# Patient Record
Sex: FEMALE | Race: WHITE | NOT HISPANIC OR LATINO | Employment: UNEMPLOYED | URBAN - METROPOLITAN AREA
[De-identification: names, ages, dates, MRNs, and addresses within clinical notes are randomized per-mention and may not be internally consistent; named-entity substitution may affect disease eponyms.]

---

## 2017-05-12 ENCOUNTER — HISTORICAL (OUTPATIENT)
Dept: RADIOLOGY | Facility: HOSPITAL | Age: 57
End: 2017-05-12

## 2019-05-31 ENCOUNTER — HISTORICAL (OUTPATIENT)
Dept: RADIOLOGY | Facility: HOSPITAL | Age: 59
End: 2019-05-31

## 2020-08-28 ENCOUNTER — HISTORICAL (OUTPATIENT)
Dept: RADIOLOGY | Facility: HOSPITAL | Age: 60
End: 2020-08-28

## 2020-10-27 ENCOUNTER — HISTORICAL (OUTPATIENT)
Dept: ADMINISTRATIVE | Facility: HOSPITAL | Age: 60
End: 2020-10-27

## 2020-11-10 ENCOUNTER — HISTORICAL (OUTPATIENT)
Dept: ADMINISTRATIVE | Facility: HOSPITAL | Age: 60
End: 2020-11-10

## 2020-11-10 LAB — SARS-COV-2 RNA RESP QL NAA+PROBE: NOT DETECTED

## 2020-11-11 ENCOUNTER — HISTORICAL (OUTPATIENT)
Dept: ADMINISTRATIVE | Facility: HOSPITAL | Age: 60
End: 2020-11-11

## 2020-11-11 LAB — GRAM STN SPEC: NORMAL

## 2020-11-14 LAB — FINAL CULTURE: NORMAL

## 2020-12-14 LAB — FINAL CULTURE: NORMAL

## 2020-12-22 ENCOUNTER — HISTORICAL (OUTPATIENT)
Dept: ADMINISTRATIVE | Facility: HOSPITAL | Age: 60
End: 2020-12-22

## 2021-02-09 ENCOUNTER — HISTORICAL (OUTPATIENT)
Dept: ADMINISTRATIVE | Facility: HOSPITAL | Age: 61
End: 2021-02-09

## 2021-05-04 ENCOUNTER — HISTORICAL (OUTPATIENT)
Dept: ADMINISTRATIVE | Facility: HOSPITAL | Age: 61
End: 2021-05-04

## 2022-04-10 ENCOUNTER — HISTORICAL (OUTPATIENT)
Dept: ADMINISTRATIVE | Facility: HOSPITAL | Age: 62
End: 2022-04-10

## 2022-04-20 ENCOUNTER — HISTORICAL (OUTPATIENT)
Dept: ADMINISTRATIVE | Facility: HOSPITAL | Age: 62
End: 2022-04-20

## 2022-04-29 VITALS
WEIGHT: 240.31 LBS | SYSTOLIC BLOOD PRESSURE: 121 MMHG | DIASTOLIC BLOOD PRESSURE: 80 MMHG | BODY MASS INDEX: 44.22 KG/M2 | HEIGHT: 62 IN

## 2022-04-30 NOTE — OP NOTE
DATE OF SURGERY:    11/11/2020    SURGEON:  Quinton Flores MD  ASSISTANT:  Nurse Nikki Shaw    PREOPERATIVE DIAGNOSES:    1. Right lower extremity surgical wound dehiscence.  2. Right bicondylar tibial plateau sequela.    POSTOPERATIVE DIAGNOSES:    1. Right lower extremity surgical wound dehiscence.  2. Right bicondylar tibial plateau sequela.    PROCEDURE:  Secondary wound closure, surgical wound dehiscence.    ANESTHESIA:  General.    ESTIMATED BLOOD LOSS:  20 cc.    ASSISTANT ATTESTATION:  nurse nikki Shaw, necessary for a skilled set of hands to assist with deep retraction as well as layered deep closure.    COMPLICATIONS:  None.    COUNTS:  All counts correct x2 at the end of the case.    INDICATIONS FOR PROCEDURE:  Ms. Arredondo is a 60-year-old female who sustained a right bicondylar tibial plateau fracture.  She has a history of morbid obesity.  She had some wound drainage from 2 separate surgical sites, one on the proximal lateral and then one on the distal medial aspect of her tibia.  She had been monitored in the clinic.  She had evidence of fibrinous exudate.  No purulent drainage was noted.  She is brought to the operating room today, after risks and benefits of treatment were discussed, for secondary wound closure following debridement of her wounds.    PROCEDURE IN DETAIL:  After informed consent was obtained, the patient was met in the preoperative holding area.  Her site was marked, and she was taken to the operating room.  She was placed supine on the operating table.  General anesthesia was induced.  All bony prominences were well padded.  Preoperative antibiotics were given.  Her right lower extremity was prepped, draped in a standard sterile fashion.  A time-out was done to indicate correct operative limb and procedure.  She had an approximately 7 cm area of her proximal lateral incision that was ellipsed sharply with a 15 blade back to good, clean  edges.  The fibrinous exudate was excised using both a scalpel and a rongeur.  A curette was used to clean the wound.  It was thoroughly irrigated with 3 L of normal saline after cultures were obtained.  Attention was then turned to the medial aspect.  A 5 cm incision on the distal aspect of her medial tibia was opened as well.  Excised the skin edges.  She was noted to have some retained sutures.  It appeared to be a deep fluid collection.  No purulence was noted.  She had some evidence of fat necrosis with serous drainage.  This area was opened, curetted clean, irrigated with cysto tubing as well.  A gram of vancomycin was split between the 2 incisions and placed deep within the wound.  Cellerate wound enhancer was then placed along the skin edges.  A deep closure was performed with a #1 PDS, followed by 2-0 Monocryl and 2-0 Prolene sutures.  Xeroform and island dressings were applied.  The patient was awakened, extubated, and taken to recovery in stable condition.    POSTOPERATIVE PLAN:  She will be discharged home today.  Nonweightbearing to the right lower extremity.  We will continue her on p.o. clindamycin.  We will monitor her cultures.  She will follow up in 2 weeks for removal of her sutures.        ______________________________  MD DESTINEY Fontenot/DODIE  DD:  11/11/2020  Time:  08:11AM  DT:  11/11/2020  Time:  08:35AM  Job #:  [id number]

## 2022-11-16 DIAGNOSIS — Z78.0 POSTMENOPAUSAL STATE: ICD-10-CM

## 2022-11-16 DIAGNOSIS — Z12.31 ENCOUNTER FOR SCREENING MAMMOGRAM FOR BREAST CANCER: Primary | ICD-10-CM

## 2022-11-23 ENCOUNTER — HOSPITAL ENCOUNTER (OUTPATIENT)
Dept: RADIOLOGY | Facility: HOSPITAL | Age: 62
Discharge: HOME | End: 2022-11-23
Attending: NURSE PRACTITIONER
Payer: MEDICAID

## 2022-11-23 DIAGNOSIS — Z78.0 POSTMENOPAUSAL STATE: ICD-10-CM

## 2022-11-23 DIAGNOSIS — Z12.31 ENCOUNTER FOR SCREENING MAMMOGRAM FOR BREAST CANCER: ICD-10-CM

## 2022-11-23 PROCEDURE — 77067 MAMMO DIGITAL SCREENING BILAT WITH TOMO: ICD-10-PCS | Mod: 26,,, | Performed by: STUDENT IN AN ORGANIZED HEALTH CARE EDUCATION/TRAINING PROGRAM

## 2022-11-23 PROCEDURE — 77067 SCR MAMMO BI INCL CAD: CPT | Mod: TC

## 2022-11-23 PROCEDURE — 77080 DXA BONE DENSITY AXIAL: CPT | Mod: TC

## 2022-11-23 PROCEDURE — 77063 MAMMO DIGITAL SCREENING BILAT WITH TOMO: ICD-10-PCS | Mod: 26,,, | Performed by: STUDENT IN AN ORGANIZED HEALTH CARE EDUCATION/TRAINING PROGRAM

## 2022-11-23 PROCEDURE — 77063 BREAST TOMOSYNTHESIS BI: CPT | Mod: 26,,, | Performed by: STUDENT IN AN ORGANIZED HEALTH CARE EDUCATION/TRAINING PROGRAM

## 2022-11-23 PROCEDURE — 77067 SCR MAMMO BI INCL CAD: CPT | Mod: 26,,, | Performed by: STUDENT IN AN ORGANIZED HEALTH CARE EDUCATION/TRAINING PROGRAM

## 2023-03-21 DIAGNOSIS — Z87.891 HISTORY OF TOBACCO USE: Primary | ICD-10-CM

## 2023-04-24 ENCOUNTER — HOSPITAL ENCOUNTER (OUTPATIENT)
Dept: RADIOLOGY | Facility: HOSPITAL | Age: 63
Discharge: HOME | End: 2023-04-24
Attending: NURSE PRACTITIONER
Payer: MEDICAID

## 2023-04-24 DIAGNOSIS — Z87.891 HISTORY OF TOBACCO USE: ICD-10-CM

## 2023-04-24 PROCEDURE — 71271 CT THORAX LUNG CANCER SCR C-: CPT | Mod: TC

## 2023-08-24 ENCOUNTER — HOSPITAL ENCOUNTER (OUTPATIENT)
Dept: RADIOLOGY | Facility: HOSPITAL | Age: 63
Discharge: HOME | End: 2023-08-24
Payer: MEDICAID

## 2023-08-24 DIAGNOSIS — M25.552 LEFT HIP PAIN: ICD-10-CM

## 2023-08-24 PROCEDURE — 73501 X-RAY EXAM HIP UNI 1 VIEW: CPT | Mod: TC,LT

## 2023-10-12 DIAGNOSIS — R31.9 HEMATURIA: Primary | ICD-10-CM

## 2023-10-20 ENCOUNTER — HOSPITAL ENCOUNTER (OUTPATIENT)
Dept: RADIOLOGY | Facility: HOSPITAL | Age: 63
Discharge: HOME | End: 2023-10-20
Attending: NURSE PRACTITIONER
Payer: MEDICAID

## 2023-10-20 DIAGNOSIS — R31.9 HEMATURIA: ICD-10-CM

## 2023-10-20 PROCEDURE — 76770 US EXAM ABDO BACK WALL COMP: CPT | Mod: TC

## 2023-10-23 DIAGNOSIS — R31.9 HEMATURIA, UNSPECIFIED TYPE: Primary | ICD-10-CM

## 2023-11-08 ENCOUNTER — OFFICE VISIT (OUTPATIENT)
Dept: UROLOGY | Facility: CLINIC | Age: 63
End: 2023-11-08
Payer: MEDICAID

## 2023-11-08 VITALS
SYSTOLIC BLOOD PRESSURE: 135 MMHG | RESPIRATION RATE: 20 BRPM | HEIGHT: 62 IN | DIASTOLIC BLOOD PRESSURE: 88 MMHG | HEART RATE: 77 BPM | OXYGEN SATURATION: 97 % | TEMPERATURE: 98 F | WEIGHT: 257 LBS | BODY MASS INDEX: 47.29 KG/M2

## 2023-11-08 DIAGNOSIS — R82.90 ABNORMAL URINALYSIS: ICD-10-CM

## 2023-11-08 DIAGNOSIS — R31.29 OTHER MICROSCOPIC HEMATURIA: ICD-10-CM

## 2023-11-08 DIAGNOSIS — R33.9 URINARY RETENTION: Primary | ICD-10-CM

## 2023-11-08 DIAGNOSIS — R31.9 HEMATURIA, UNSPECIFIED TYPE: ICD-10-CM

## 2023-11-08 LAB
BILIRUB SERPL-MCNC: NEGATIVE MG/DL
BLOOD URINE, POC: NORMAL
COLOR, POC UA: YELLOW
GLUCOSE UR QL STRIP: NEGATIVE
KETONES UR QL STRIP: NEGATIVE
LEUKOCYTE ESTERASE URINE, POC: NORMAL
NITRITE, POC UA: NEGATIVE
PH, POC UA: 6
POC RESIDUAL URINE VOLUME: 189 ML (ref 0–100)
PROTEIN, POC: 30
SPECIFIC GRAVITY, POC UA: 1.03
UROBILINOGEN, POC UA: 1

## 2023-11-08 PROCEDURE — 1159F MED LIST DOCD IN RCRD: CPT | Mod: CPTII,,, | Performed by: NURSE PRACTITIONER

## 2023-11-08 PROCEDURE — 4010F PR ACE/ARB THEARPY RXD/TAKEN: ICD-10-PCS | Mod: CPTII,,, | Performed by: NURSE PRACTITIONER

## 2023-11-08 PROCEDURE — 81001 URINALYSIS AUTO W/SCOPE: CPT | Mod: PBBFAC | Performed by: NURSE PRACTITIONER

## 2023-11-08 PROCEDURE — 1160F PR REVIEW ALL MEDS BY PRESCRIBER/CLIN PHARMACIST DOCUMENTED: ICD-10-PCS | Mod: CPTII,,, | Performed by: NURSE PRACTITIONER

## 2023-11-08 PROCEDURE — 3079F PR MOST RECENT DIASTOLIC BLOOD PRESSURE 80-89 MM HG: ICD-10-PCS | Mod: CPTII,,, | Performed by: NURSE PRACTITIONER

## 2023-11-08 PROCEDURE — 3008F BODY MASS INDEX DOCD: CPT | Mod: CPTII,,, | Performed by: NURSE PRACTITIONER

## 2023-11-08 PROCEDURE — 99204 PR OFFICE/OUTPT VISIT, NEW, LEVL IV, 45-59 MIN: ICD-10-PCS | Mod: S$PBB,,, | Performed by: NURSE PRACTITIONER

## 2023-11-08 PROCEDURE — 1160F RVW MEDS BY RX/DR IN RCRD: CPT | Mod: CPTII,,, | Performed by: NURSE PRACTITIONER

## 2023-11-08 PROCEDURE — 3079F DIAST BP 80-89 MM HG: CPT | Mod: CPTII,,, | Performed by: NURSE PRACTITIONER

## 2023-11-08 PROCEDURE — 1159F PR MEDICATION LIST DOCUMENTED IN MEDICAL RECORD: ICD-10-PCS | Mod: CPTII,,, | Performed by: NURSE PRACTITIONER

## 2023-11-08 PROCEDURE — 3008F PR BODY MASS INDEX (BMI) DOCUMENTED: ICD-10-PCS | Mod: CPTII,,, | Performed by: NURSE PRACTITIONER

## 2023-11-08 PROCEDURE — 99215 OFFICE O/P EST HI 40 MIN: CPT | Mod: PBBFAC | Performed by: NURSE PRACTITIONER

## 2023-11-08 PROCEDURE — 4010F ACE/ARB THERAPY RXD/TAKEN: CPT | Mod: CPTII,,, | Performed by: NURSE PRACTITIONER

## 2023-11-08 PROCEDURE — 3075F SYST BP GE 130 - 139MM HG: CPT | Mod: CPTII,,, | Performed by: NURSE PRACTITIONER

## 2023-11-08 PROCEDURE — 3075F PR MOST RECENT SYSTOLIC BLOOD PRESS GE 130-139MM HG: ICD-10-PCS | Mod: CPTII,,, | Performed by: NURSE PRACTITIONER

## 2023-11-08 PROCEDURE — 87086 URINE CULTURE/COLONY COUNT: CPT | Performed by: NURSE PRACTITIONER

## 2023-11-08 PROCEDURE — 51798 US URINE CAPACITY MEASURE: CPT | Mod: PBBFAC | Performed by: NURSE PRACTITIONER

## 2023-11-08 PROCEDURE — 99204 OFFICE O/P NEW MOD 45 MIN: CPT | Mod: S$PBB,,, | Performed by: NURSE PRACTITIONER

## 2023-11-08 RX ORDER — PANTOPRAZOLE SODIUM 20 MG/1
20 TABLET, DELAYED RELEASE ORAL
COMMUNITY
Start: 2023-11-01

## 2023-11-08 RX ORDER — ALENDRONATE SODIUM 70 MG/1
70 TABLET ORAL
COMMUNITY
Start: 2023-11-03

## 2023-11-08 RX ORDER — DULOXETIN HYDROCHLORIDE 60 MG/1
60 CAPSULE, DELAYED RELEASE ORAL
COMMUNITY
Start: 2023-11-01

## 2023-11-08 RX ORDER — GABAPENTIN 300 MG/1
300 CAPSULE ORAL 3 TIMES DAILY
COMMUNITY
Start: 2023-06-05

## 2023-11-08 RX ORDER — ROSUVASTATIN CALCIUM 5 MG/1
5 TABLET, COATED ORAL
COMMUNITY
Start: 2023-11-01

## 2023-11-08 RX ORDER — OLMESARTAN MEDOXOMIL 40 MG/1
40 TABLET ORAL
COMMUNITY
Start: 2023-11-01

## 2023-11-08 RX ORDER — TAMSULOSIN HYDROCHLORIDE 0.4 MG/1
0.4 CAPSULE ORAL DAILY
Qty: 30 CAPSULE | Refills: 11 | Status: SHIPPED | OUTPATIENT
Start: 2023-11-08 | End: 2024-11-07

## 2023-11-08 RX ORDER — CETIRIZINE HYDROCHLORIDE 10 MG/1
10 TABLET ORAL
COMMUNITY
Start: 2023-11-01

## 2023-11-08 NOTE — PROGRESS NOTES
Patient seen by Megan MORALES. Pt will RTC 1 month with CT scan. Pt will also be scheduled for cysto. Pt eduction given both written and verbal.

## 2023-11-08 NOTE — PROGRESS NOTES
Chief Complaint:   Chief Complaint   Patient presents with    Hematuria     Complaints of urinary incontinence       HPI: Chief Complaint:   Chief Complaint   Patient presents with    Hematuria     Complaints of urinary incontinence       HPI:  Patient is a 63-year-old female referred to Urology for urinary incontinence.  Patient seen by PCP with episodes of urinary frequency and urgency and incontinence wearing 3 and 4 saturated pads per day and also nocturia 3-4 episodes at night.  Patient denies any dysuria, urinary retention, gross hematuria.  Bladder scan 194 mL.  Patient has a abnormal urinalysis specimen therefore we will run a culture and sensitivity on patient is urinalysis.  Patient has a persistent history of hematuria therefore will get a cystoscope also along with a CT of the abdomen pelvis with and without contrast.    Allergies:  Review of patient's allergies indicates:  No Known Allergies    Medications:  Current Outpatient Medications   Medication Sig Dispense Refill    alendronate (FOSAMAX) 70 MG tablet Take 70 mg by mouth every 7 days.      cetirizine (ZYRTEC) 10 MG tablet Take 10 mg by mouth.      DULoxetine (CYMBALTA) 60 MG capsule Take 60 mg by mouth.      gabapentin (NEURONTIN) 300 MG capsule Take 300 mg by mouth 3 (three) times daily.      olmesartan (BENICAR) 40 MG tablet Take 40 mg by mouth.      pantoprazole (PROTONIX) 20 MG tablet Take 20 mg by mouth.      rosuvastatin (CRESTOR) 5 MG tablet Take 5 mg by mouth.      tamsulosin (FLOMAX) 0.4 mg Cap Take 1 capsule (0.4 mg total) by mouth once daily. 30 capsule 11     No current facility-administered medications for this visit.       Review of Systems:  General: No fever, chills, fatigability, or weight loss.  Skin: No rashes, itching, or changes in color or texture of skin.  Chest: Denies ESPINOZA, cyanosis, wheezing, cough, and sputum production.  Abdomen: Appetite fine. No weight loss. Denies diarrhea, abdominal pain, hematemesis, or blood in  stool.  Musculoskeletal: No joint stiffness or swelling. Denies back pain.  : As above.  All other review of systems negative.    PMH:  Past Medical History:   Diagnosis Date    Hypertension        PSH:  Past Surgical History:   Procedure Laterality Date    gallbadder      Right Leg Surgery Right     Right wrist surgery Right     SPLENECTOMY, TOTAL         FamHx:  Family History   Problem Relation Age of Onset    Heart disease Father     Diabetes Father     Heart disease Mother     Pancreatic cancer Mother     Diabetes Mother        SocHx:  Social History     Socioeconomic History    Marital status: Unknown   Tobacco Use    Smoking status: Former     Types: Cigarettes    Smokeless tobacco: Never       Physical Exam:  Vitals:    11/08/23 1254   BP: 135/88   Pulse: 77   Resp: 20   Temp: 98.2 °F (36.8 °C)     General: A&Ox3, no apparent distress, no deformities  Neck: No masses, normal thyroid  Lungs: CTA sarah, no use of accessory muscles  Heart: RRR, no arrhythmias  Abdomen: Soft, NT, ND, no masses, no hernias, no hepatosplenomegaly  Lymphatic: Neck and groin nodes negative  Skin: The skin is warm and dry. No jaundice.  Ext: No c/c/e.    Labs:     Urinalysis:  Results for orders placed or performed in visit on 11/08/23   POCT URINE DIPSTICK WITH MICROSCOPE, AUTOMATED   Result Value Ref Range    Color, UA Yellow     Spec Grav UA 1.030     pH, UA 6.0     WBC, UA Small     Nitrite, UA Negative     Protein, POC 30     Glucose, UA Negative     Ketones, UA Negative     Urobilinogen, UA 1.0     Bilirubin, POC Negative     Blood, UA Moderate          Imaging:       Impression:  1. Hematuria, unspecified type  - Ambulatory referral/consult to Urology  - POCT URINE DIPSTICK WITH MICROSCOPE, AUTOMATED  - tamsulosin (FLOMAX) 0.4 mg Cap; Take 1 capsule (0.4 mg total) by mouth once daily.  Dispense: 30 capsule; Refill: 11  - CT Abdomen Pelvis W Wo Contrast; Future  - Creatinine, serum; Future  - Creatinine, serum; Future    2.  Urinary retention  - tamsulosin (FLOMAX) 0.4 mg Cap; Take 1 capsule (0.4 mg total) by mouth once daily.  Dispense: 30 capsule; Refill: 11      Plan: Instructed patient tamsulosin 0.4 mg p.o. daily.  Instructed patient on a CT of the abdomen pelvis with and without contrast to assess patient for hematuria and schedule patient for a cystoscope with Dr. Hartmann next available appointment.  Instructed patient on timed voiding every 2-3 hrs, double voiding, avoidance of bladder irritants such as alcohol, citrus foods, chocolate, caffeinated drinks, energy drinks, spicy foods, sugar, caffeine products, sodas. Instructed patient to avoid drinking fluids 1-2 hours prior to bedtime & void immediately before bedtime.  RTC one-month for re-evaluation of urinary retention with bladder scan.

## 2023-11-08 NOTE — PROGRESS NOTES
Bladder scan performed as per order, showing  189 mL of urine. Provider made aware of results.  Urine also sent to lab for culture. Pt will be notified of results.

## 2023-11-10 LAB — BACTERIA UR CULT: NORMAL

## 2023-11-20 ENCOUNTER — HOSPITAL ENCOUNTER (OUTPATIENT)
Dept: RADIOLOGY | Facility: HOSPITAL | Age: 63
Discharge: HOME | End: 2023-11-20
Attending: NURSE PRACTITIONER
Payer: MEDICAID

## 2023-11-20 DIAGNOSIS — R31.9 HEMATURIA, UNSPECIFIED TYPE: ICD-10-CM

## 2023-11-20 LAB
CREAT SERPL-MCNC: 0.6 MG/DL (ref 0.5–1.4)
SAMPLE: NORMAL

## 2023-11-20 PROCEDURE — 25500020 PHARM REV CODE 255: Performed by: NURSE PRACTITIONER

## 2023-11-20 PROCEDURE — 74178 CT ABD&PLV WO CNTR FLWD CNTR: CPT | Mod: TC

## 2023-11-20 RX ADMIN — IOPAMIDOL 100 ML: 755 INJECTION, SOLUTION INTRAVENOUS at 10:11

## 2023-12-29 ENCOUNTER — OFFICE VISIT (OUTPATIENT)
Dept: UROLOGY | Facility: CLINIC | Age: 63
End: 2023-12-29
Payer: MEDICAID

## 2023-12-29 VITALS
DIASTOLIC BLOOD PRESSURE: 79 MMHG | HEIGHT: 62 IN | WEIGHT: 254 LBS | SYSTOLIC BLOOD PRESSURE: 133 MMHG | BODY MASS INDEX: 46.74 KG/M2 | TEMPERATURE: 98 F | OXYGEN SATURATION: 98 % | RESPIRATION RATE: 18 BRPM | HEART RATE: 79 BPM

## 2023-12-29 DIAGNOSIS — R82.90 ABNORMAL URINE: ICD-10-CM

## 2023-12-29 DIAGNOSIS — R31.9 HEMATURIA, UNSPECIFIED TYPE: Primary | ICD-10-CM

## 2023-12-29 DIAGNOSIS — N20.0 STAGHORN CALCULUS: ICD-10-CM

## 2023-12-29 LAB
BILIRUB SERPL-MCNC: NEGATIVE MG/DL
BLOOD URINE, POC: NORMAL
COLOR, POC UA: YELLOW
GLUCOSE UR QL STRIP: NEGATIVE
KETONES UR QL STRIP: NEGATIVE
LEUKOCYTE ESTERASE URINE, POC: NORMAL
NITRITE, POC UA: NEGATIVE
PH, POC UA: 6.5
PROTEIN, POC: NEGATIVE
SPECIFIC GRAVITY, POC UA: 1.02
UROBILINOGEN, POC UA: 0.2

## 2023-12-29 PROCEDURE — 1159F PR MEDICATION LIST DOCUMENTED IN MEDICAL RECORD: ICD-10-PCS | Mod: CPTII,,, | Performed by: NURSE PRACTITIONER

## 2023-12-29 PROCEDURE — 3008F PR BODY MASS INDEX (BMI) DOCUMENTED: ICD-10-PCS | Mod: CPTII,,, | Performed by: NURSE PRACTITIONER

## 2023-12-29 PROCEDURE — 1160F RVW MEDS BY RX/DR IN RCRD: CPT | Mod: CPTII,,, | Performed by: NURSE PRACTITIONER

## 2023-12-29 PROCEDURE — 99215 OFFICE O/P EST HI 40 MIN: CPT | Mod: PBBFAC | Performed by: NURSE PRACTITIONER

## 2023-12-29 PROCEDURE — 81001 URINALYSIS AUTO W/SCOPE: CPT | Mod: PBBFAC | Performed by: NURSE PRACTITIONER

## 2023-12-29 PROCEDURE — 99213 OFFICE O/P EST LOW 20 MIN: CPT | Mod: S$PBB,,, | Performed by: NURSE PRACTITIONER

## 2023-12-29 PROCEDURE — 3078F DIAST BP <80 MM HG: CPT | Mod: CPTII,,, | Performed by: NURSE PRACTITIONER

## 2023-12-29 PROCEDURE — 87086 URINE CULTURE/COLONY COUNT: CPT | Performed by: NURSE PRACTITIONER

## 2023-12-29 PROCEDURE — 3075F PR MOST RECENT SYSTOLIC BLOOD PRESS GE 130-139MM HG: ICD-10-PCS | Mod: CPTII,,, | Performed by: NURSE PRACTITIONER

## 2023-12-29 PROCEDURE — 99213 PR OFFICE/OUTPT VISIT, EST, LEVL III, 20-29 MIN: ICD-10-PCS | Mod: S$PBB,,, | Performed by: NURSE PRACTITIONER

## 2023-12-29 PROCEDURE — 1159F MED LIST DOCD IN RCRD: CPT | Mod: CPTII,,, | Performed by: NURSE PRACTITIONER

## 2023-12-29 PROCEDURE — 1160F PR REVIEW ALL MEDS BY PRESCRIBER/CLIN PHARMACIST DOCUMENTED: ICD-10-PCS | Mod: CPTII,,, | Performed by: NURSE PRACTITIONER

## 2023-12-29 PROCEDURE — 3008F BODY MASS INDEX DOCD: CPT | Mod: CPTII,,, | Performed by: NURSE PRACTITIONER

## 2023-12-29 PROCEDURE — 3075F SYST BP GE 130 - 139MM HG: CPT | Mod: CPTII,,, | Performed by: NURSE PRACTITIONER

## 2023-12-29 PROCEDURE — 4010F ACE/ARB THERAPY RXD/TAKEN: CPT | Mod: CPTII,,, | Performed by: NURSE PRACTITIONER

## 2023-12-29 PROCEDURE — 4010F PR ACE/ARB THEARPY RXD/TAKEN: ICD-10-PCS | Mod: CPTII,,, | Performed by: NURSE PRACTITIONER

## 2023-12-29 PROCEDURE — 3078F PR MOST RECENT DIASTOLIC BLOOD PRESSURE < 80 MM HG: ICD-10-PCS | Mod: CPTII,,, | Performed by: NURSE PRACTITIONER

## 2023-12-29 RX ORDER — MELOXICAM 7.5 MG/1
7.5 TABLET ORAL 2 TIMES DAILY PRN
COMMUNITY
Start: 2023-12-01

## 2023-12-29 NOTE — PROGRESS NOTES
Chief Complaint:   Chief Complaint   Patient presents with    1 mth f/u w/CT       HPI:  Patient is a 63-year-old female referred to Urology for urinary incontinence.  Patient seen by PCP with episodes of urinary frequency and urgency and incontinence wearing 3 and 4 saturated pads per day and also nocturia 3-4 episodes at night.  Patient currently on tamsulosin 0.4 mg p.o. daily with improve symptoms of urinary frequency, urgency and incontinence she is wearing less saturated pads 1-2 a day.  Patient denies any dysuria, urinary retention, gross hematuria.  Today is discussed with patient results of CT which revealed a large staghorn in the right kidney along with multiple stones throughout the right lower kidney.  Patient's UA revealed moderate RBCs and moderate WBCs therefore we will run a culture and sensitivity and notify patient of results when completed.  Plan also is to refer patient to [facility] to be evaluated treated for staghorn calculus causing frequent UTIs through urgent cares outside facility therefore we do not have records.     Allergies:  Review of patient's allergies indicates:  No Known Allergies    Medications:  Current Outpatient Medications   Medication Sig Dispense Refill    alendronate (FOSAMAX) 70 MG tablet Take 70 mg by mouth every 7 days.      cetirizine (ZYRTEC) 10 MG tablet Take 10 mg by mouth.      DULoxetine (CYMBALTA) 60 MG capsule Take 60 mg by mouth.      gabapentin (NEURONTIN) 300 MG capsule Take 300 mg by mouth 3 (three) times daily.      meloxicam (MOBIC) 7.5 MG tablet Take 7.5 mg by mouth 2 (two) times daily as needed for Pain.      olmesartan (BENICAR) 40 MG tablet Take 40 mg by mouth.      pantoprazole (PROTONIX) 20 MG tablet Take 20 mg by mouth.      rosuvastatin (CRESTOR) 5 MG tablet Take 5 mg by mouth.      tamsulosin (FLOMAX) 0.4 mg Cap Take 1 capsule (0.4 mg total) by mouth once daily. 30 capsule 11     No current facility-administered medications for this visit.        Review of Systems:  General: No fever, chills, fatigability, or weight loss.  Skin: No rashes, itching, or changes in color or texture of skin.  Chest: Denies ESPINOZA, cyanosis, wheezing, cough, and sputum production.  Abdomen: Appetite fine. No weight loss. Denies diarrhea, abdominal pain, hematemesis, or blood in stool.  Musculoskeletal: No joint stiffness or swelling. Denies back pain.  : As above.  All other review of systems negative.    PMH:  Past Medical History:   Diagnosis Date    Hypertension        PSH:  Past Surgical History:   Procedure Laterality Date    gallbadder      Right Leg Surgery Right     Right wrist surgery Right     SPLENECTOMY, TOTAL         FamHx:  Family History   Problem Relation Age of Onset    Heart disease Father     Diabetes Father     Heart disease Mother     Pancreatic cancer Mother     Diabetes Mother        SocHx:  Social History     Socioeconomic History    Marital status: Unknown   Tobacco Use    Smoking status: Former     Types: Cigarettes    Smokeless tobacco: Never   Substance and Sexual Activity    Alcohol use: Not Currently    Drug use: Not Currently       Physical Exam:  Vitals:    12/29/23 0742   BP: 133/79   Pulse: 79   Resp: 18   Temp: 97.8 °F (36.6 °C)     General: A&Ox3, no apparent distress, no deformities  Neck: No masses, normal thyroid  Lungs: CTA sarah, no use of accessory muscles  Heart: RRR, no arrhythmias  Abdomen: Soft, NT, ND, no masses, no hernias, no hepatosplenomegaly  Lymphatic: Neck and groin nodes negative  Skin: The skin is warm and dry. No jaundice.  Ext: No c/c/e.    Urinalysis:  Results for orders placed or performed in visit on 12/29/23   POCT URINE DIPSTICK WITH MICROSCOPE, AUTOMATED   Result Value Ref Range    Color, UA Yellow     Spec Grav UA 1.020     pH, UA 6.5     WBC, UA Moderate     Nitrite, UA Negative     Protein, POC Negative     Glucose, UA Negative     Ketones, UA Negative     Urobilinogen, UA 0.2     Bilirubin, POC Negative      Blood, UA Moderate    Microscopic urinalysis revealed moderate RBCs, moderate WBCs, nitrites will run a culture and sensitivity and notify patient results when completed.      Imaging:  CT of the abdomen pelvis without contrast on 11/20/2023: Staghorn calculus within a prominent right renal pelvis measuring up to the 2.5 cm in greatest diameter.  Multiple nonobstructing calcification noted to the right kidney measuring up to 1 cm.      Impression:  1. Hematuria, unspecified type  - POCT URINE DIPSTICK WITH MICROSCOPE, AUTOMATED      Plan:  Instructed patient to continue tamsulosin and will be referred to [facility] for evaluation treatment of staghorn removal, will notify patient of urine culture and sensitivity results and treat accordingly.  RTC 4 months.  Increase fluid intake, limit salt in diet, limit protein to 30%, intake adequate calcium, decrease brand, soy, Beets, Almonds, Rhubard, Buckwheat flour, baked potato, raspberry, potato chips Spinach, Miso, Tahini, sesame, Swiss Chard. Instructed patient on Avoiding bladder irritants, such as alcohol, citrus drinks or foods,salty foods, energy drinks, spicy foods, caffeine, sodas.

## 2023-12-31 LAB
BACTERIA UR CULT: ABNORMAL

## 2024-02-06 DIAGNOSIS — N20.0 CALCULUS OF KIDNEY: Primary | ICD-10-CM

## 2024-02-16 ENCOUNTER — HOSPITAL ENCOUNTER (OUTPATIENT)
Dept: RADIOLOGY | Facility: HOSPITAL | Age: 64
Discharge: HOME | End: 2024-02-16
Attending: UROLOGY
Payer: MEDICAID

## 2024-02-16 DIAGNOSIS — N20.0 CALCULUS OF KIDNEY: ICD-10-CM

## 2024-02-16 PROCEDURE — 63600175 PHARM REV CODE 636 W HCPCS: Performed by: UROLOGY

## 2024-02-16 PROCEDURE — A9562 TC99M MERTIATIDE: HCPCS

## 2024-02-16 RX ORDER — FUROSEMIDE 10 MG/ML
40 INJECTION INTRAMUSCULAR; INTRAVENOUS ONCE
Status: COMPLETED | OUTPATIENT
Start: 2024-02-16 | End: 2024-02-16

## 2024-02-16 RX ADMIN — FUROSEMIDE 40 MG: 10 INJECTION, SOLUTION INTRAMUSCULAR; INTRAVENOUS at 08:02

## 2024-08-28 ENCOUNTER — CLINICAL SUPPORT (OUTPATIENT)
Dept: RESPIRATORY THERAPY | Facility: HOSPITAL | Age: 64
End: 2024-08-28
Attending: NURSE PRACTITIONER
Payer: MEDICARE

## 2024-08-28 ENCOUNTER — HOSPITAL ENCOUNTER (OUTPATIENT)
Dept: RADIOLOGY | Facility: HOSPITAL | Age: 64
Discharge: HOME | End: 2024-08-28
Attending: NURSE PRACTITIONER
Payer: MEDICARE

## 2024-08-28 DIAGNOSIS — Z01.818 OTHER SPECIFIED PRE-OPERATIVE EXAMINATION: ICD-10-CM

## 2024-08-28 DIAGNOSIS — Z01.818 OTHER SPECIFIED PRE-OPERATIVE EXAMINATION: Primary | ICD-10-CM

## 2024-08-28 DIAGNOSIS — Z01.811 PRE-OP CHEST EXAM: ICD-10-CM

## 2024-08-28 PROCEDURE — 93010 ELECTROCARDIOGRAM REPORT: CPT | Mod: ,,, | Performed by: INTERNAL MEDICINE

## 2024-08-28 PROCEDURE — 71046 X-RAY EXAM CHEST 2 VIEWS: CPT | Mod: TC

## 2024-08-28 PROCEDURE — 93005 ELECTROCARDIOGRAM TRACING: CPT

## 2024-08-29 LAB
OHS QRS DURATION: 76 MS
OHS QTC CALCULATION: 403 MS

## 2024-09-23 NOTE — PROGRESS NOTES
-- DO NOT REPLY / DO NOT REPLY ALL --  -- Message is from [facility] [employer] ([facility]) --    ONLY TO BE USED WITHIN A REFILL MEDICATION ENCOUNTER    Med Refill  Is the patient currently having any symptoms?: No/Non-Emergent symptoms    Name of medication requested: See pended med    Is this the first request for the medication in the last 48 hours?: Yes    Patient is requesting a medication refill - medication is on active medication list    Patient is currently OUT of the requested medication - sent as HIGH priority    Full name of the provider who ordered the medication: karmen gonsales    [facility] site name / Account # for provider: [facility]: Pharmacy  [id number] [address]    Patient confirmed the above pharmacy as correct?  Yes    Caller Information         Type Contact Phone/Fax    04/01/2024 09:35 AM CDT Phone (Incoming) KHURRAM BURRIS (Emergency Contact) [phone]            Alternative phone number: n/a    Can a detailed message be left?: Yes         Medication refill requested for Losartan Potassium 50 MG Oral Tablet (COZAAR)   Last Refill/Prescribed: Date 5/12/2023, # of tablets: 90, # of refills approved:0     Medication: Losartan Potassium 50 MG Oral Tablet (COZAAR)   Last office visit date: 3/1/2024  Medication Refill Protocol Failed.  Failed criteria: due to patient last bp reading. Sent to clinician to review.     Medication refill requested for Furosemide 20 MG Oral Tablet (LASIX)   Last Refill/Prescribed: Date 1/19/2024, # of tablets: 30, # of refills approved:1     Medication: Furosemide 20 MG Oral Tablet (LASIX)   Last office visit date: 3/1/2024  Medication Refill Protocol Failed.  Failed criteria: due to patient last bp reading. Sent to clinician to review.     The patient has a scheduled follow up with pcp Dr Alcantar on 8/1/2024    The patient has a scheduled follow up with Bhanu Alcantar MD on 5/29/2024   Pt seen by ANDREW Guzman; Urine collected & sent to lab; Referral to [facility] placed for staghorn calculus; Pt instructed to return to clinic in 4 months; Discharge paperwork given w/pt verbalizing understanding     2

## 2025-04-16 DIAGNOSIS — Z12.31 ENCOUNTER FOR SCREENING MAMMOGRAM FOR MALIGNANT NEOPLASM OF BREAST: Primary | ICD-10-CM

## 2025-08-18 ENCOUNTER — HOSPITAL ENCOUNTER (OUTPATIENT)
Dept: RADIOLOGY | Facility: HOSPITAL | Age: 65
Discharge: HOME | End: 2025-08-18
Payer: MEDICARE

## 2025-08-18 DIAGNOSIS — Z12.31 ENCOUNTER FOR SCREENING MAMMOGRAM FOR MALIGNANT NEOPLASM OF BREAST: ICD-10-CM

## 2025-08-18 PROCEDURE — 77063 BREAST TOMOSYNTHESIS BI: CPT | Mod: 26,,, | Performed by: STUDENT IN AN ORGANIZED HEALTH CARE EDUCATION/TRAINING PROGRAM

## 2025-08-18 PROCEDURE — 77067 SCR MAMMO BI INCL CAD: CPT | Mod: 26,,, | Performed by: STUDENT IN AN ORGANIZED HEALTH CARE EDUCATION/TRAINING PROGRAM

## 2025-08-18 PROCEDURE — 77063 BREAST TOMOSYNTHESIS BI: CPT | Mod: TC
